# Patient Record
Sex: FEMALE | Race: ASIAN
[De-identification: names, ages, dates, MRNs, and addresses within clinical notes are randomized per-mention and may not be internally consistent; named-entity substitution may affect disease eponyms.]

---

## 2017-06-30 ENCOUNTER — HOSPITAL ENCOUNTER (OUTPATIENT)
Dept: HOSPITAL 10 - SDS | Age: 62
LOS: 1 days | Discharge: HOME | End: 2017-07-01
Attending: SURGERY
Payer: MEDICAID

## 2017-06-30 VITALS — RESPIRATION RATE: 16 BRPM | DIASTOLIC BLOOD PRESSURE: 60 MMHG | SYSTOLIC BLOOD PRESSURE: 119 MMHG | HEART RATE: 70 BPM

## 2017-06-30 VITALS — DIASTOLIC BLOOD PRESSURE: 68 MMHG | SYSTOLIC BLOOD PRESSURE: 125 MMHG | HEART RATE: 70 BPM | RESPIRATION RATE: 17 BRPM

## 2017-06-30 VITALS — HEART RATE: 80 BPM | DIASTOLIC BLOOD PRESSURE: 77 MMHG | RESPIRATION RATE: 18 BRPM | SYSTOLIC BLOOD PRESSURE: 124 MMHG

## 2017-06-30 VITALS — RESPIRATION RATE: 18 BRPM | HEART RATE: 80 BPM | DIASTOLIC BLOOD PRESSURE: 80 MMHG | SYSTOLIC BLOOD PRESSURE: 155 MMHG

## 2017-06-30 VITALS — SYSTOLIC BLOOD PRESSURE: 122 MMHG | RESPIRATION RATE: 16 BRPM | DIASTOLIC BLOOD PRESSURE: 70 MMHG | HEART RATE: 80 BPM

## 2017-06-30 VITALS — HEART RATE: 88 BPM | SYSTOLIC BLOOD PRESSURE: 133 MMHG | RESPIRATION RATE: 22 BRPM | DIASTOLIC BLOOD PRESSURE: 76 MMHG

## 2017-06-30 VITALS — DIASTOLIC BLOOD PRESSURE: 70 MMHG | SYSTOLIC BLOOD PRESSURE: 125 MMHG | RESPIRATION RATE: 19 BRPM | HEART RATE: 78 BPM

## 2017-06-30 VITALS — SYSTOLIC BLOOD PRESSURE: 141 MMHG | RESPIRATION RATE: 14 BRPM | DIASTOLIC BLOOD PRESSURE: 83 MMHG | HEART RATE: 92 BPM

## 2017-06-30 VITALS — RESPIRATION RATE: 18 BRPM | SYSTOLIC BLOOD PRESSURE: 138 MMHG | DIASTOLIC BLOOD PRESSURE: 63 MMHG | HEART RATE: 70 BPM

## 2017-06-30 VITALS — RESPIRATION RATE: 18 BRPM | DIASTOLIC BLOOD PRESSURE: 80 MMHG | HEART RATE: 66 BPM | SYSTOLIC BLOOD PRESSURE: 131 MMHG

## 2017-06-30 VITALS
WEIGHT: 94.8 LBS | BODY MASS INDEX: 19.9 KG/M2 | BODY MASS INDEX: 19.9 KG/M2 | BODY MASS INDEX: 19.9 KG/M2 | HEIGHT: 58 IN | HEIGHT: 58 IN | WEIGHT: 94.8 LBS

## 2017-06-30 VITALS — SYSTOLIC BLOOD PRESSURE: 142 MMHG | DIASTOLIC BLOOD PRESSURE: 80 MMHG | RESPIRATION RATE: 18 BRPM

## 2017-06-30 VITALS — HEART RATE: 70 BPM | RESPIRATION RATE: 18 BRPM | SYSTOLIC BLOOD PRESSURE: 142 MMHG | DIASTOLIC BLOOD PRESSURE: 65 MMHG

## 2017-06-30 VITALS — DIASTOLIC BLOOD PRESSURE: 78 MMHG | RESPIRATION RATE: 18 BRPM | SYSTOLIC BLOOD PRESSURE: 136 MMHG

## 2017-06-30 VITALS — HEART RATE: 70 BPM | SYSTOLIC BLOOD PRESSURE: 120 MMHG | DIASTOLIC BLOOD PRESSURE: 70 MMHG | RESPIRATION RATE: 15 BRPM

## 2017-06-30 DIAGNOSIS — C50.911: Primary | ICD-10-CM

## 2017-06-30 DIAGNOSIS — I10: ICD-10-CM

## 2017-06-30 LAB
ADD SCAN DIFF: NO
ALBUMIN SERPL-MCNC: 4.9 G/DL (ref 3.3–4.9)
ALBUMIN/GLOB SERPL: 1.32 {RATIO}
ALP SERPL-CCNC: 77 IU/L (ref 42–121)
ALT SERPL-CCNC: 31 IU/L (ref 13–69)
ANION GAP SERPL CALC-SCNC: 20 MMOL/L (ref 8–16)
APTT BLD: 27.5 SEC (ref 25–35)
AST SERPL-CCNC: 27 IU/L (ref 15–46)
BASOPHILS # BLD AUTO: 0.1 10^3/UL (ref 0–0.1)
BASOPHILS NFR BLD: 1.1 % (ref 0–2)
BILIRUB DIRECT SERPL-MCNC: 0 MG/DL (ref 0–0.2)
BILIRUB SERPL-MCNC: 0.7 MG/DL (ref 0.2–1.3)
BUN SERPL-MCNC: 11 MG/DL (ref 7–20)
CALCIUM SERPL-MCNC: 10 MG/DL (ref 8.4–10.2)
CHLORIDE SERPL-SCNC: 98 MMOL/L (ref 97–110)
CO2 SERPL-SCNC: 26 MMOL/L (ref 21–31)
CREAT SERPL-MCNC: 0.71 MG/DL (ref 0.44–1)
EOSINOPHIL # BLD: 0.8 10^3/UL (ref 0–0.5)
EOSINOPHIL NFR BLD: 9.2 % (ref 0–7)
ERYTHROCYTE [DISTWIDTH] IN BLOOD BY AUTOMATED COUNT: 11.6 % (ref 11.5–14.5)
GLOBULIN SER-MCNC: 3.7 G/DL (ref 1.3–3.2)
GLUCOSE SERPL-MCNC: 98 MG/DL (ref 70–220)
HCT VFR BLD CALC: 36.4 % (ref 37–47)
HGB BLD-MCNC: 12.8 G/DL (ref 12–16)
INR PPP: 0.91
LYMPHOCYTES # BLD AUTO: 1.6 10^3/UL (ref 0.8–2.9)
LYMPHOCYTES NFR BLD AUTO: 17.6 % (ref 15–51)
MCH RBC QN AUTO: 32.6 PG (ref 29–33)
MCHC RBC AUTO-ENTMCNC: 35.2 G/DL (ref 32–37)
MCV RBC AUTO: 92.6 FL (ref 82–101)
MONOCYTES # BLD: 0.4 10^3/UL (ref 0.3–0.9)
MONOCYTES NFR BLD: 4.8 % (ref 0–11)
NEUTROPHILS # BLD: 6 10^3/UL (ref 1.6–7.5)
NEUTROPHILS NFR BLD AUTO: 67.1 % (ref 39–77)
NRBC # BLD MANUAL: 0 10^3/UL (ref 0–0)
NRBC BLD QL: 0 /100WBC (ref 0–0)
PLATELET # BLD: 385 10^3/UL (ref 140–415)
PMV BLD AUTO: 8.8 FL (ref 7.4–10.4)
POTASSIUM SERPL-SCNC: 4.5 MMOL/L (ref 3.5–5.1)
PROT SERPL-MCNC: 8.6 G/DL (ref 6.1–8.1)
PROTHROMBIN TIME: 12.3 SEC (ref 12.2–14.2)
PT RATIO: 1
RBC # BLD AUTO: 3.93 10^6/UL (ref 4.2–5.4)
SODIUM SERPL-SCNC: 139 MMOL/L (ref 135–144)
WBC # BLD AUTO: 8.9 10^3/UL (ref 4.8–10.8)

## 2017-06-30 PROCEDURE — 83036 HEMOGLOBIN GLYCOSYLATED A1C: CPT

## 2017-06-30 PROCEDURE — 88313 SPECIAL STAINS GROUP 2: CPT

## 2017-06-30 PROCEDURE — 19301 PARTIAL MASTECTOMY: CPT

## 2017-06-30 PROCEDURE — 85730 THROMBOPLASTIN TIME PARTIAL: CPT

## 2017-06-30 PROCEDURE — 85610 PROTHROMBIN TIME: CPT

## 2017-06-30 PROCEDURE — 85025 COMPLETE CBC W/AUTO DIFF WBC: CPT

## 2017-06-30 PROCEDURE — 38500 BIOPSY/REMOVAL LYMPH NODES: CPT

## 2017-06-30 PROCEDURE — 93005 ELECTROCARDIOGRAM TRACING: CPT

## 2017-06-30 PROCEDURE — 80048 BASIC METABOLIC PNL TOTAL CA: CPT

## 2017-06-30 PROCEDURE — 71010: CPT

## 2017-06-30 PROCEDURE — 38792 RA TRACER ID OF SENTINL NODE: CPT

## 2017-06-30 PROCEDURE — 80053 COMPREHEN METABOLIC PANEL: CPT

## 2017-06-30 PROCEDURE — 88307 TISSUE EXAM BY PATHOLOGIST: CPT

## 2017-06-30 RX ADMIN — DEXTROSE, SODIUM CHLORIDE, AND POTASSIUM CHLORIDE SCH MLS/HR: 5; .45; .15 INJECTION INTRAVENOUS at 17:05

## 2017-06-30 RX ADMIN — DEXTROSE, SODIUM CHLORIDE, AND POTASSIUM CHLORIDE SCH MLS/HR: 5; .45; .15 INJECTION INTRAVENOUS at 23:30
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
## 2017-06-30 NOTE — RADRPT
PROCEDURE:   XR Chest. 

 

CLINICAL INDICATION:   Preoperative for right partial mastectomy.  History of breast cancer.

 

TECHNIQUE:   Single frontal view. 

 

COMPARISON:   None. 

 

FINDINGS:

The lungs are clear. 

The heart size is normal. 

There is no pleural effusion. 

There is no pneumothorax.   

 

IMPRESSION:

1.  Normal chest radiograph.

 

RPTAT: QQ

_____________________________________________ 

.Prosper Herrera MD, MD           Date    Time 

Electronically viewed and signed by .Prosper Herrera MD, MD on 06/30/2017 12:44 

 

D:  06/30/2017 12:44  T:  06/30/2017 12:44

.R/

## 2017-07-01 VITALS — RESPIRATION RATE: 16 BRPM | DIASTOLIC BLOOD PRESSURE: 74 MMHG | HEART RATE: 57 BPM | SYSTOLIC BLOOD PRESSURE: 128 MMHG

## 2017-07-01 VITALS — SYSTOLIC BLOOD PRESSURE: 137 MMHG | RESPIRATION RATE: 18 BRPM | DIASTOLIC BLOOD PRESSURE: 77 MMHG

## 2017-07-01 VITALS — SYSTOLIC BLOOD PRESSURE: 126 MMHG | DIASTOLIC BLOOD PRESSURE: 76 MMHG | RESPIRATION RATE: 22 BRPM

## 2017-07-01 LAB
ANION GAP SERPL CALC-SCNC: 11 MMOL/L (ref 8–16)
BASOPHILS # BLD AUTO: 0.1 10^3/UL (ref 0–0.1)
BASOPHILS NFR BLD: 0.9 % (ref 0–2)
BUN SERPL-MCNC: 9 MG/DL (ref 7–20)
CALCIUM SERPL-MCNC: 9.5 MG/DL (ref 8.4–10.2)
CHLORIDE SERPL-SCNC: 101 MMOL/L (ref 97–110)
CO2 SERPL-SCNC: 27 MMOL/L (ref 21–31)
CREAT SERPL-MCNC: 0.81 MG/DL (ref 0.44–1)
EOSINOPHIL # BLD: 1 10^3/UL (ref 0–0.5)
EOSINOPHIL NFR BLD: 11.6 % (ref 0–7)
ERYTHROCYTE [DISTWIDTH] IN BLOOD BY AUTOMATED COUNT: 11.8 % (ref 11.5–14.5)
GLUCOSE SERPL-MCNC: 124 MG/DL (ref 70–220)
HCT VFR BLD CALC: 34.7 % (ref 37–47)
HGB BLD-MCNC: 11.5 G/DL (ref 12–16)
LYMPHOCYTES # BLD AUTO: 1.3 10^3/UL (ref 0.8–2.9)
LYMPHOCYTES NFR BLD AUTO: 14.6 % (ref 15–51)
MCH RBC QN AUTO: 31.4 PG (ref 29–33)
MCHC RBC AUTO-ENTMCNC: 33.1 G/DL (ref 32–37)
MCV RBC AUTO: 94.8 FL (ref 82–101)
MONOCYTES # BLD: 0.7 10^3/UL (ref 0.3–0.9)
MONOCYTES NFR BLD: 7.2 % (ref 0–11)
NEUTROPHILS # BLD: 5.9 10^3/UL (ref 1.6–7.5)
NEUTROPHILS NFR BLD AUTO: 65.6 % (ref 39–77)
NRBC # BLD MANUAL: 0 10^3/UL (ref 0–0)
NRBC BLD QL: 0 /100WBC (ref 0–0)
PLATELET # BLD: 348 10^3/UL (ref 140–415)
PMV BLD AUTO: 9.3 FL (ref 7.4–10.4)
POTASSIUM SERPL-SCNC: 5.4 MMOL/L (ref 3.5–5.1)
RBC # BLD AUTO: 3.66 10^6/UL (ref 4.2–5.4)
SODIUM SERPL-SCNC: 134 MMOL/L (ref 135–144)
WBC # BLD AUTO: 9 10^3/UL (ref 4.8–10.8)

## 2017-07-01 RX ADMIN — DEXTROSE, SODIUM CHLORIDE, AND POTASSIUM CHLORIDE SCH MLS/HR: 5; .45; .15 INJECTION INTRAVENOUS at 02:32

## 2017-07-01 RX ADMIN — DEXTROSE, SODIUM CHLORIDE, AND POTASSIUM CHLORIDE SCH MLS/HR: 5; .45; .15 INJECTION INTRAVENOUS at 11:18

## 2017-07-01 NOTE — RADRPT
Vent Rate: 79 bpm

RR Interval: 0 msec

SD Interval: 130 msec

QRS Duration: 82 msec

QT Interval: 370 msec

QTC Interval: 424 msec

P-R-T Axis: 73 - 72 - 68 degrees

 

 Normal sinus rhythm

 Normal ECG

 

Electronically Signed By: Musa Tam

[id number]

## 2017-07-01 NOTE — PDOCDIS
Discharge Instructions


CONDITION


Patient Condition:  Stable





HOME CARE INSTRUCTIONS:


Diet Instructions:  RegularSpecial Diet:  regular diet





ACTIVITY:








Activity Restrictions:   Slowly Increase Activity





 Rest between Activity





 Avoid heavy lifting





 Avoid Heavy Housework





Bathing Restrictions:  Sponge BathActivity Restrictions Comment:  DO NOT WET 

INCISION





FOLLOW UP/APPOINTMENTS


Follow-up Plan


FU with Primary MD x 1 wek


FU with Surgery as recommended.


Call 911  or go to the nearest hospital  if symptoms get worsen. Patient 

verbalized understanding discharge instructions. Plan dw Dr Thomas/staff.











MELISSA JACOME Jul 1, 2017 14:32

## 2017-07-01 NOTE — DS
Date/Time of Note


Date/Time of Note


DATE: 7/1/17 


TIME: 14:29





Discharge Summary


Admission/Discharge Info


Admit Date/Time





Discharge Date/Time





Patient Condition:  Stable


Hx of Present Illness


Patient is 62-year-old female with history of hypertension prediabetes.  

Patient is a recently diagnosed with invasive cancer of the Ross right breast.  

Patient was evaluated by Dr. May in general surgery consultation and was 

admitted to the hospital and underwent right partial  mastectomy with axillary 

dissection utilizing sentinel lymph node technique.  Postoperatively patient 

experienced significant pain and patient will be admitted for further 

evaluation and management.  Patient denies any shortness of breath denies.


Home Meds


Active Scripts


Pantoprazole* (Protonix*) 40 Mg Tablet., 40 MG PO DAILY, #30 TAB


   Prov:MELISSA JACOME         7/1/17


Docusate Sodium* (Colace*) 100 Mg Capsule, 100 MG PO DAILY, #30 CAP


   Prov:MELISSA JACOME         7/1/17


Hydrocodone/Acetaminophen (Norco 5-325 Tablet) 1 Each Tablet, 6 EACH PO Q6 Y 

for PAIN LEVEL 7-10, #20 TAB


   Prov:MELISSA JACOME         7/1/17


Reported Medications


Omeprazole* (Omeprazole*) 20 Mg Capsule., 20 MG PO DAILY, #30 CAP


   6/30/17


Losartan Potassium* (Losartan Potassium*) 25 Mg Tablet, 25 MG PO DAILY Y for 

ELEVATED BLOOD PRESSURE, TAB


   6/30/17


Primary Care Provider


Tyshawn Liz


Pending Labs





Laboratory Tests








Test


  7/1/17


08:30


 


White Blood Count


  9.010^3/ul


(4.8-10.8)


 


Red Blood Count


  3.6610^6/ul


(4.20-5.40)


 


Hemoglobin


  11.5g/dl


(12.0-16.0)


 


Hematocrit


  34.7%


(37.0-47.0)


 


Mean Corpuscular Volume


  94.8fl


(82.0-101.0)


 


Mean Corpuscular Hemoglobin


  31.4pg


(29.0-33.0)


 


Mean Corpuscular Hemoglobin


Concent 33.1g/dl


(32.0-37.0)


 


Red Cell Distribution Width


  11.8%


(11.5-14.5)


 


Platelet Count


  43233^3/UL


(140-415)


 


Mean Platelet Volume


  9.3fl


(7.4-10.4)


 


Neutrophils %


  65.6%


(39.0-77.0)


 


Lymphocytes %


  14.6%


(15.0-51.0)


 


Monocytes %


  7.2%


(0.0-11.0)


 


Eosinophils %


  11.6%


(0.0-7.0)


 


Basophils % 0.9% (0.0-2.0) 


 


Nucleated Red Blood Cells %


  0.0/100WBC


(0.0-0.0)


 


Neutrophils #


  5.910^3/ul


(1.6-7.5)


 


Lymphocytes #


  1.310^3/ul


(0.8-2.9)


 


Monocytes #


  0.710^3/ul


(0.3-0.9)


 


Eosinophils #


  1.010^3/ul


(0.0-0.5)


 


Basophils #


  0.110^3/ul


(0.0-0.1)


 


Nucleated Red Blood Cells #


  0.010^3/ul


(0.0-0.0)


 


Sodium Level


  134mmol/L


(135-144)


 


Potassium Level


  5.4mmol/L


(3.5-5.1)


 


Chloride Level


  101mmol/L


()


 


Carbon Dioxide Level


  27mmol/L


(21-31)


 


Anion Gap 11 (8-16) 


 


Blood Urea Nitrogen 9mg/dl (7-20) 


 


Creatinine


  0.81mg/dl


(0.44-1.00)


 


Glucose Level


  124mg/dl


()


 


Hemoglobin A1c 5.6% (0-5.9) 


 


Calcium Level


  9.5mg/dl


(8.4-10.2)

















MELISSA JACOME Jul 1, 2017 14:29

## 2017-08-14 ENCOUNTER — HOSPITAL ENCOUNTER (OUTPATIENT)
Age: 62
Discharge: HOME | End: 2017-08-14

## 2017-08-14 DIAGNOSIS — C50.911: Primary | ICD-10-CM

## 2017-08-14 PROCEDURE — 36561 INSERT TUNNELED CV CATH: CPT

## 2017-08-14 PROCEDURE — 76942 ECHO GUIDE FOR BIOPSY: CPT

## 2018-11-16 ENCOUNTER — HOSPITAL ENCOUNTER (OUTPATIENT)
Dept: HOSPITAL 91 - SDS | Age: 63
Discharge: HOME | End: 2018-11-16
Payer: COMMERCIAL

## 2018-11-16 ENCOUNTER — HOSPITAL ENCOUNTER (OUTPATIENT)
Age: 63
Discharge: HOME | End: 2018-11-16

## 2018-11-16 DIAGNOSIS — Z45.2: Primary | ICD-10-CM

## 2018-11-16 DIAGNOSIS — Z85.3: ICD-10-CM

## 2018-11-16 DIAGNOSIS — I10: ICD-10-CM

## 2018-11-16 PROCEDURE — 93005 ELECTROCARDIOGRAM TRACING: CPT

## 2018-11-16 PROCEDURE — 36590 REMOVAL TUNNELED CV CATH: CPT

## 2018-11-16 PROCEDURE — 71045 X-RAY EXAM CHEST 1 VIEW: CPT

## 2018-11-16 PROCEDURE — 88300 SURGICAL PATH GROSS: CPT

## 2018-11-16 RX ADMIN — FENTANYL CITRATE 1 MCG: 50 INJECTION, SOLUTION INTRAMUSCULAR; INTRAVENOUS at 15:45
